# Patient Record
Sex: MALE | Race: WHITE | NOT HISPANIC OR LATINO | Employment: OTHER | URBAN - METROPOLITAN AREA
[De-identification: names, ages, dates, MRNs, and addresses within clinical notes are randomized per-mention and may not be internally consistent; named-entity substitution may affect disease eponyms.]

---

## 2021-05-19 ENCOUNTER — HOSPITAL ENCOUNTER (EMERGENCY)
Facility: HOSPITAL | Age: 66
Discharge: HOME | End: 2021-05-19
Attending: EMERGENCY MEDICINE | Admitting: EMERGENCY MEDICINE

## 2021-05-19 ENCOUNTER — APPOINTMENT (OUTPATIENT)
Dept: CT IMAGING | Facility: HOSPITAL | Age: 66
End: 2021-05-19

## 2021-05-19 ENCOUNTER — APPOINTMENT (OUTPATIENT)
Dept: GENERAL RADIOLOGY | Facility: HOSPITAL | Age: 66
End: 2021-05-19

## 2021-05-19 VITALS
RESPIRATION RATE: 16 BRPM | SYSTOLIC BLOOD PRESSURE: 154 MMHG | TEMPERATURE: 97.8 F | DIASTOLIC BLOOD PRESSURE: 91 MMHG | HEART RATE: 99 BPM | OXYGEN SATURATION: 100 %

## 2021-05-19 DIAGNOSIS — Z86.39 HISTORY OF DIET-CONTROLLED DIABETES: ICD-10-CM

## 2021-05-19 DIAGNOSIS — R55 NEAR SYNCOPE: ICD-10-CM

## 2021-05-19 DIAGNOSIS — R73.9 HYPERGLYCEMIA: ICD-10-CM

## 2021-05-19 DIAGNOSIS — T67.5XXA HEAT EXHAUSTION, INITIAL ENCOUNTER: Primary | ICD-10-CM

## 2021-05-19 LAB
ALBUMIN SERPL-MCNC: 4.7 G/DL (ref 3.5–5.2)
ALBUMIN/GLOB SERPL: 1.4 G/DL
ALP SERPL-CCNC: 107 U/L (ref 39–117)
ALT SERPL W P-5'-P-CCNC: 20 U/L (ref 1–41)
ANION GAP SERPL CALCULATED.3IONS-SCNC: 11.4 MMOL/L (ref 5–15)
AST SERPL-CCNC: 21 U/L (ref 1–40)
BASOPHILS # BLD AUTO: 0.04 10*3/MM3 (ref 0–0.2)
BASOPHILS NFR BLD AUTO: 0.3 % (ref 0–1.5)
BILIRUB SERPL-MCNC: 0.6 MG/DL (ref 0–1.2)
BUN SERPL-MCNC: 14 MG/DL (ref 8–23)
BUN/CREAT SERPL: 15.1 (ref 7–25)
CALCIUM SPEC-SCNC: 9.9 MG/DL (ref 8.6–10.5)
CHLORIDE SERPL-SCNC: 100 MMOL/L (ref 98–107)
CK SERPL-CCNC: 45 U/L (ref 20–200)
CO2 SERPL-SCNC: 26.6 MMOL/L (ref 22–29)
CREAT SERPL-MCNC: 0.93 MG/DL (ref 0.76–1.27)
DEPRECATED RDW RBC AUTO: 42.7 FL (ref 37–54)
EOSINOPHIL # BLD AUTO: 0.04 10*3/MM3 (ref 0–0.4)
EOSINOPHIL NFR BLD AUTO: 0.3 % (ref 0.3–6.2)
ERYTHROCYTE [DISTWIDTH] IN BLOOD BY AUTOMATED COUNT: 13.6 % (ref 12.3–15.4)
GFR SERPL CREATININE-BSD FRML MDRD: 82 ML/MIN/1.73
GLOBULIN UR ELPH-MCNC: 3.3 GM/DL
GLUCOSE SERPL-MCNC: 271 MG/DL (ref 65–99)
HCT VFR BLD AUTO: 46.1 % (ref 37.5–51)
HGB BLD-MCNC: 15.6 G/DL (ref 13–17.7)
HOLD SPECIMEN: NORMAL
HOLD SPECIMEN: NORMAL
IMM GRANULOCYTES # BLD AUTO: 0.1 10*3/MM3 (ref 0–0.05)
IMM GRANULOCYTES NFR BLD AUTO: 0.7 % (ref 0–0.5)
LYMPHOCYTES # BLD AUTO: 1.24 10*3/MM3 (ref 0.7–3.1)
LYMPHOCYTES NFR BLD AUTO: 8.3 % (ref 19.6–45.3)
MAGNESIUM SERPL-MCNC: 1.8 MG/DL (ref 1.6–2.4)
MCH RBC QN AUTO: 29.5 PG (ref 26.6–33)
MCHC RBC AUTO-ENTMCNC: 33.8 G/DL (ref 31.5–35.7)
MCV RBC AUTO: 87.1 FL (ref 79–97)
MONOCYTES # BLD AUTO: 0.9 10*3/MM3 (ref 0.1–0.9)
MONOCYTES NFR BLD AUTO: 6 % (ref 5–12)
NEUTROPHILS NFR BLD AUTO: 12.7 10*3/MM3 (ref 1.7–7)
NEUTROPHILS NFR BLD AUTO: 84.4 % (ref 42.7–76)
NRBC BLD AUTO-RTO: 0 /100 WBC (ref 0–0.2)
PLATELET # BLD AUTO: 187 10*3/MM3 (ref 140–450)
PMV BLD AUTO: 9.7 FL (ref 6–12)
POTASSIUM SERPL-SCNC: 4.3 MMOL/L (ref 3.5–5.2)
PROT SERPL-MCNC: 8 G/DL (ref 6–8.5)
QT INTERVAL: 351 MS
RBC # BLD AUTO: 5.29 10*6/MM3 (ref 4.14–5.8)
SODIUM SERPL-SCNC: 138 MMOL/L (ref 136–145)
TROPONIN T SERPL-MCNC: <0.01 NG/ML (ref 0–0.03)
WBC # BLD AUTO: 15.02 10*3/MM3 (ref 3.4–10.8)
WHOLE BLOOD HOLD SPECIMEN: NORMAL
WHOLE BLOOD HOLD SPECIMEN: NORMAL

## 2021-05-19 PROCEDURE — 25010000002 ONDANSETRON PER 1 MG: Performed by: PHYSICIAN ASSISTANT

## 2021-05-19 PROCEDURE — 70450 CT HEAD/BRAIN W/O DYE: CPT

## 2021-05-19 PROCEDURE — 85025 COMPLETE CBC W/AUTO DIFF WBC: CPT

## 2021-05-19 PROCEDURE — 93005 ELECTROCARDIOGRAM TRACING: CPT

## 2021-05-19 PROCEDURE — 80053 COMPREHEN METABOLIC PANEL: CPT

## 2021-05-19 PROCEDURE — 96374 THER/PROPH/DIAG INJ IV PUSH: CPT

## 2021-05-19 PROCEDURE — 93010 ELECTROCARDIOGRAM REPORT: CPT | Performed by: INTERNAL MEDICINE

## 2021-05-19 PROCEDURE — 99284 EMERGENCY DEPT VISIT MOD MDM: CPT

## 2021-05-19 PROCEDURE — 82550 ASSAY OF CK (CPK): CPT | Performed by: EMERGENCY MEDICINE

## 2021-05-19 PROCEDURE — 71045 X-RAY EXAM CHEST 1 VIEW: CPT

## 2021-05-19 PROCEDURE — 84484 ASSAY OF TROPONIN QUANT: CPT

## 2021-05-19 PROCEDURE — 83735 ASSAY OF MAGNESIUM: CPT

## 2021-05-19 RX ORDER — SODIUM CHLORIDE 0.9 % (FLUSH) 0.9 %
10 SYRINGE (ML) INJECTION AS NEEDED
Status: DISCONTINUED | OUTPATIENT
Start: 2021-05-19 | End: 2021-05-20 | Stop reason: HOSPADM

## 2021-05-19 RX ORDER — ONDANSETRON 2 MG/ML
4 INJECTION INTRAMUSCULAR; INTRAVENOUS ONCE
Status: COMPLETED | OUTPATIENT
Start: 2021-05-19 | End: 2021-05-19

## 2021-05-19 RX ADMIN — ONDANSETRON 4 MG: 2 INJECTION INTRAMUSCULAR; INTRAVENOUS at 21:28

## 2021-05-19 RX ADMIN — SODIUM CHLORIDE 2000 ML: 9 INJECTION, SOLUTION INTRAVENOUS at 21:12

## 2021-06-30 ENCOUNTER — TRANSCRIBE ORDERS (OUTPATIENT)
Dept: ADMINISTRATIVE | Facility: HOSPITAL | Age: 66
End: 2021-06-30

## 2021-06-30 DIAGNOSIS — M79.672 PAIN IN BOTH FEET: Primary | ICD-10-CM

## 2021-06-30 DIAGNOSIS — R20.2 PARESTHESIA: ICD-10-CM

## 2021-06-30 DIAGNOSIS — M79.671 PAIN IN BOTH FEET: Primary | ICD-10-CM

## 2021-09-20 ENCOUNTER — HOSPITAL ENCOUNTER (OUTPATIENT)
Dept: INFUSION THERAPY | Facility: HOSPITAL | Age: 66
Discharge: HOME | End: 2021-09-20
Admitting: PSYCHIATRY & NEUROLOGY

## 2021-09-20 DIAGNOSIS — M79.672 PAIN IN BOTH FEET: ICD-10-CM

## 2021-09-20 DIAGNOSIS — R20.2 PARESTHESIA: ICD-10-CM

## 2021-09-20 DIAGNOSIS — M79.671 PAIN IN BOTH FEET: ICD-10-CM

## 2021-09-20 PROCEDURE — 95886 MUSC TEST DONE W/N TEST COMP: CPT | Performed by: PSYCHIATRY & NEUROLOGY

## 2021-09-20 PROCEDURE — 95909 NRV CNDJ TST 5-6 STUDIES: CPT

## 2021-09-20 PROCEDURE — 95886 MUSC TEST DONE W/N TEST COMP: CPT

## 2021-09-20 PROCEDURE — 95909 NRV CNDJ TST 5-6 STUDIES: CPT | Performed by: PSYCHIATRY & NEUROLOGY

## 2021-09-20 NOTE — PROCEDURES
EMG and Nerve Conduction Studies    I.      Instrument used: Neuromax 1002  II.     Please see data sheets for tabular summary of NCS and details on methods, temperatures and lab standards.   III.    EMG muscles tested for upper extremity studies include the deltoid, biceps, triceps, pronator teres, extensor digitorum communis, first dorsal interosseous and abductor pollicis brevis.    IV.   EMG muscles tested for lower extremity studies include the vastus lateralis, tibialis anterior, peroneus longus, medial gastrocnemius and extensor digitorum brevis.    V.    Additional muscles tested as needed.  Paraspinal muscles tested as needed.   VI.   Please see data sheets for tabular summary of EMG findings.   VII. The complete report includes the data sheets.      Indication: Pain and numbness in the feet  History: 65-year-old white male with diabetes who describes pain numbness and tingling in the feet.  Symptoms are equal on both sides.  He also has chronic low back pain.      Ht: Not reported  Wt: Not reported  HbA1C: No results found for: HGBA1C  TSH: No results found for: TSH    Technical summary:  Nerve conduction studies were obtained in the right leg with 1 comparison on the left.  The feet were extremely cold initially and despite warming them did not stay warm and temperature correction was used where indicated.  Needle examination was obtained on selected muscles in both legs.    Results:  1.  Prolonged right sural sensory distal latency with temperature correction at 4.5 ms with low amplitude of 3.7 µV.  2.  Absent right superficial peroneal sensory potential.  3.  Prolonged right peroneal motor distal latency at 6.5 ms with slow conduction velocities of 27.5 m/s below the knee and 31.6 m/s in the short segment across the fibular head.  Low amplitude of 0.300 mV from ankle stimulation.  4.  Slow right tibial motor velocity at 32 m/s with normal distal latency with temperature correction.  Normal amplitudes.   Slow left tibial motor velocity at 31.9 m/s with normal distal latency and amplitudes.  5.  Needle examination of selected muscles in both legs showed fibrillations and/or positive sharp waves in the extensor digitorum brevis muscles bilaterally.  There was an increased number of large motor units on the left with increased firing rate and reduced interference pattern to a mild degree.  On the right there were very few motor units with severely reduced recruitment.  There were a few positive sharp waves in the right medial gastrocnemius.  The motor units were normal on both sides with normal recruitment remaining muscles tested showed normal insertional activities.  There was an increased number of large motor units in both peroneus longus muscles with increased firing rate and reduced interference pattern.  Remaining muscles tested showed normal motor units and recruitment patterns lumbar paraspinals at L5 showed a few positive sharp waves on each side.    Impression:  Abnormal study showing moderate to severe peripheral neuropathy.  There are both sensory and motor components and a combination of demyelinative and axonal features.  Needle exam changes were consistent with the nerve conduction findings however there were also paraspinal abnormalities suggesting root level involvement from his peripheral neuropathy versus superimposed bilateral lumbosacral radiculopathies.  Clinical correlation is suggested.  Study results were discussed with the patient.    Gal Chopra M.D.              Dictated utilizing Dragon dictation.

## 2022-08-29 ENCOUNTER — HOSPITAL ENCOUNTER (OUTPATIENT)
Dept: CARDIOLOGY | Facility: HOSPITAL | Age: 67
Discharge: HOME | End: 2022-08-29
Admitting: INTERNAL MEDICINE

## 2022-08-29 VITALS — DIASTOLIC BLOOD PRESSURE: 84 MMHG | SYSTOLIC BLOOD PRESSURE: 147 MMHG | HEART RATE: 91 BPM

## 2022-08-29 VITALS — HEIGHT: 70 IN | BODY MASS INDEX: 25.77 KG/M2 | WEIGHT: 180 LBS

## 2022-08-29 DIAGNOSIS — R07.2 PRECORDIAL PAIN: ICD-10-CM

## 2022-08-29 DIAGNOSIS — R42 DIZZINESS: ICD-10-CM

## 2022-08-29 DIAGNOSIS — R06.02 SHORTNESS OF BREATH: ICD-10-CM

## 2022-08-29 LAB
ALBUMIN SERPL-MCNC: 4.8 G/DL (ref 3.5–5.2)
ALBUMIN/GLOB SERPL: 1.6 G/DL
ALP SERPL-CCNC: 87 U/L (ref 39–117)
ALT SERPL W P-5'-P-CCNC: 14 U/L (ref 1–41)
ANION GAP SERPL CALCULATED.3IONS-SCNC: 15.8 MMOL/L (ref 5–15)
AORTIC ARCH: 2.1 CM
ASCENDING AORTA: 3.3 CM
AST SERPL-CCNC: 17 U/L (ref 1–40)
BASOPHILS # BLD AUTO: 0.04 10*3/MM3 (ref 0–0.2)
BASOPHILS NFR BLD AUTO: 0.5 % (ref 0–1.5)
BH CV ECHO MEAS - ACS: 1.75 CM
BH CV ECHO MEAS - AO MAX PG: 8.4 MMHG
BH CV ECHO MEAS - AO MEAN PG: 4.4 MMHG
BH CV ECHO MEAS - AO ROOT DIAM: 3.5 CM
BH CV ECHO MEAS - AO V2 MAX: 144.8 CM/SEC
BH CV ECHO MEAS - AO V2 VTI: 26.1 CM
BH CV ECHO MEAS - AVA(I,D): 1.36 CM2
BH CV ECHO MEAS - EDV(CUBED): 84.6 ML
BH CV ECHO MEAS - EDV(MOD-SP2): 117 ML
BH CV ECHO MEAS - EDV(MOD-SP4): 121 ML
BH CV ECHO MEAS - EF(MOD-BP): 56.2 %
BH CV ECHO MEAS - EF(MOD-SP2): 56.4 %
BH CV ECHO MEAS - EF(MOD-SP4): 60.3 %
BH CV ECHO MEAS - ESV(CUBED): 26 ML
BH CV ECHO MEAS - ESV(MOD-SP2): 51 ML
BH CV ECHO MEAS - ESV(MOD-SP4): 48 ML
BH CV ECHO MEAS - FS: 32.6 %
BH CV ECHO MEAS - IVS/LVPW: 0.94 CM
BH CV ECHO MEAS - IVSD: 0.91 CM
BH CV ECHO MEAS - LAT PEAK E' VEL: 11.5 CM/SEC
BH CV ECHO MEAS - LV DIASTOLIC VOL/BSA (35-75): 60.6 CM2
BH CV ECHO MEAS - LV MASS(C)D: 133.9 GRAMS
BH CV ECHO MEAS - LV MAX PG: 1.34 MMHG
BH CV ECHO MEAS - LV MEAN PG: 0.74 MMHG
BH CV ECHO MEAS - LV SYSTOLIC VOL/BSA (12-30): 24 CM2
BH CV ECHO MEAS - LV V1 MAX: 57.8 CM/SEC
BH CV ECHO MEAS - LV V1 VTI: 10.2 CM
BH CV ECHO MEAS - LVIDD: 4.4 CM
BH CV ECHO MEAS - LVIDS: 3 CM
BH CV ECHO MEAS - LVOT AREA: 3.5 CM2
BH CV ECHO MEAS - LVOT DIAM: 2.1 CM
BH CV ECHO MEAS - LVPWD: 0.96 CM
BH CV ECHO MEAS - MED PEAK E' VEL: 8.2 CM/SEC
BH CV ECHO MEAS - MR MAX PG: 65.4 MMHG
BH CV ECHO MEAS - MR MAX VEL: 404.2 CM/SEC
BH CV ECHO MEAS - MV A DUR: 0.08 SEC
BH CV ECHO MEAS - MV A MAX VEL: 76.3 CM/SEC
BH CV ECHO MEAS - MV DEC SLOPE: 308.4 CM/SEC2
BH CV ECHO MEAS - MV DEC TIME: 0.16 MSEC
BH CV ECHO MEAS - MV E MAX VEL: 50.1 CM/SEC
BH CV ECHO MEAS - MV E/A: 0.66
BH CV ECHO MEAS - MV MAX PG: 2.7 MMHG
BH CV ECHO MEAS - MV MEAN PG: 1.25 MMHG
BH CV ECHO MEAS - MV P1/2T: 63.4 MSEC
BH CV ECHO MEAS - MV V2 VTI: 15 CM
BH CV ECHO MEAS - MVA(P1/2T): 3.5 CM2
BH CV ECHO MEAS - MVA(VTI): 2.38 CM2
BH CV ECHO MEAS - PA ACC TIME: 0.1 SEC
BH CV ECHO MEAS - PA PR(ACCEL): 33.8 MMHG
BH CV ECHO MEAS - PA V2 MAX: 113.4 CM/SEC
BH CV ECHO MEAS - PULM A REVS DUR: 0.1 SEC
BH CV ECHO MEAS - PULM A REVS VEL: 23.6 CM/SEC
BH CV ECHO MEAS - PULM DIAS VEL: 45.8 CM/SEC
BH CV ECHO MEAS - PULM S/D: 0.72
BH CV ECHO MEAS - PULM SYS VEL: 33 CM/SEC
BH CV ECHO MEAS - QP/QS: 0.78
BH CV ECHO MEAS - RV MAX PG: 1.67 MMHG
BH CV ECHO MEAS - RV V1 MAX: 64.7 CM/SEC
BH CV ECHO MEAS - RV V1 VTI: 10.8 CM
BH CV ECHO MEAS - RVOT DIAM: 1.81 CM
BH CV ECHO MEAS - SI(MOD-SP2): 33.1 ML/M2
BH CV ECHO MEAS - SI(MOD-SP4): 36.6 ML/M2
BH CV ECHO MEAS - SUP REN AO DIAM: 2 CM
BH CV ECHO MEAS - SV(LVOT): 35.6 ML
BH CV ECHO MEAS - SV(MOD-SP2): 66 ML
BH CV ECHO MEAS - SV(MOD-SP4): 73 ML
BH CV ECHO MEAS - SV(RVOT): 27.9 ML
BH CV ECHO MEAS - TAPSE (>1.6): 2.04 CM
BH CV ECHO MEASUREMENTS AVERAGE E/E' RATIO: 5.09
BH CV XLRA - RV BASE: 3.2 CM
BH CV XLRA - RV LENGTH: 5.6 CM
BH CV XLRA - RV MID: 2.9 CM
BH CV XLRA - TDI S': 15.7 CM/SEC
BILIRUB SERPL-MCNC: 0.5 MG/DL (ref 0–1.2)
BUN SERPL-MCNC: 8 MG/DL (ref 8–23)
BUN/CREAT SERPL: 10.4 (ref 7–25)
CALCIUM SPEC-SCNC: 9.5 MG/DL (ref 8.6–10.5)
CHLORIDE SERPL-SCNC: 103 MMOL/L (ref 98–107)
CO2 SERPL-SCNC: 24.2 MMOL/L (ref 22–29)
CREAT SERPL-MCNC: 0.77 MG/DL (ref 0.76–1.27)
D DIMER PPP FEU-MCNC: <0.27 MCGFEU/ML (ref 0–0.49)
DEPRECATED RDW RBC AUTO: 42.7 FL (ref 37–54)
EGFRCR SERPLBLD CKD-EPI 2021: 98.7 ML/MIN/1.73
EOSINOPHIL # BLD AUTO: 0.12 10*3/MM3 (ref 0–0.4)
EOSINOPHIL NFR BLD AUTO: 1.5 % (ref 0.3–6.2)
ERYTHROCYTE [DISTWIDTH] IN BLOOD BY AUTOMATED COUNT: 13.9 % (ref 12.3–15.4)
GLOBULIN UR ELPH-MCNC: 3 GM/DL
GLUCOSE SERPL-MCNC: 159 MG/DL (ref 65–99)
HCT VFR BLD AUTO: 43.4 % (ref 37.5–51)
HGB BLD-MCNC: 14.5 G/DL (ref 13–17.7)
IMM GRANULOCYTES # BLD AUTO: 0.04 10*3/MM3 (ref 0–0.05)
IMM GRANULOCYTES NFR BLD AUTO: 0.5 % (ref 0–0.5)
LEFT ATRIUM VOLUME INDEX: 23.3 ML/M2
LYMPHOCYTES # BLD AUTO: 2.47 10*3/MM3 (ref 0.7–3.1)
LYMPHOCYTES NFR BLD AUTO: 31.2 % (ref 19.6–45.3)
MAXIMAL PREDICTED HEART RATE: 154 BPM
MCH RBC QN AUTO: 28.5 PG (ref 26.6–33)
MCHC RBC AUTO-ENTMCNC: 33.4 G/DL (ref 31.5–35.7)
MCV RBC AUTO: 85.4 FL (ref 79–97)
MONOCYTES # BLD AUTO: 0.48 10*3/MM3 (ref 0.1–0.9)
MONOCYTES NFR BLD AUTO: 6.1 % (ref 5–12)
NEUTROPHILS NFR BLD AUTO: 4.77 10*3/MM3 (ref 1.7–7)
NEUTROPHILS NFR BLD AUTO: 60.2 % (ref 42.7–76)
NRBC BLD AUTO-RTO: 0.1 /100 WBC (ref 0–0.2)
NT-PROBNP SERPL-MCNC: 155 PG/ML (ref 0–900)
PLATELET # BLD AUTO: 179 10*3/MM3 (ref 140–450)
PMV BLD AUTO: 9.9 FL (ref 6–12)
POTASSIUM SERPL-SCNC: 4 MMOL/L (ref 3.5–5.2)
PROT SERPL-MCNC: 7.8 G/DL (ref 6–8.5)
RBC # BLD AUTO: 5.08 10*6/MM3 (ref 4.14–5.8)
SINUS: 3.1 CM
SODIUM SERPL-SCNC: 143 MMOL/L (ref 136–145)
STJ: 2.5 CM
STRESS TARGET HR: 131 BPM
TROPONIN T SERPL-MCNC: <0.01 NG/ML (ref 0–0.03)
WBC NRBC COR # BLD: 7.92 10*3/MM3 (ref 3.4–10.8)

## 2022-08-29 PROCEDURE — 80053 COMPREHEN METABOLIC PANEL: CPT

## 2022-08-29 PROCEDURE — 94760 N-INVAS EAR/PLS OXIMETRY 1: CPT

## 2022-08-29 PROCEDURE — 93306 TTE W/DOPPLER COMPLETE: CPT | Performed by: INTERNAL MEDICINE

## 2022-08-29 PROCEDURE — 36415 COLL VENOUS BLD VENIPUNCTURE: CPT

## 2022-08-29 PROCEDURE — 25010000002 PERFLUTREN (DEFINITY) 8.476 MG IN SODIUM CHLORIDE (PF) 0.9 % 10 ML INJECTION: Performed by: INTERNAL MEDICINE

## 2022-08-29 PROCEDURE — 99204 OFFICE O/P NEW MOD 45 MIN: CPT | Performed by: INTERNAL MEDICINE

## 2022-08-29 PROCEDURE — 83880 ASSAY OF NATRIURETIC PEPTIDE: CPT | Performed by: INTERNAL MEDICINE

## 2022-08-29 PROCEDURE — 85379 FIBRIN DEGRADATION QUANT: CPT | Performed by: INTERNAL MEDICINE

## 2022-08-29 PROCEDURE — 85025 COMPLETE CBC W/AUTO DIFF WBC: CPT

## 2022-08-29 PROCEDURE — 84484 ASSAY OF TROPONIN QUANT: CPT | Performed by: INTERNAL MEDICINE

## 2022-08-29 PROCEDURE — 93306 TTE W/DOPPLER COMPLETE: CPT

## 2022-08-29 PROCEDURE — 93010 ELECTROCARDIOGRAM REPORT: CPT | Performed by: INTERNAL MEDICINE

## 2022-08-29 PROCEDURE — 93005 ELECTROCARDIOGRAM TRACING: CPT | Performed by: INTERNAL MEDICINE

## 2022-08-29 RX ORDER — NITROGLYCERIN 0.4 MG/1
0.4 TABLET SUBLINGUAL
Status: SHIPPED | OUTPATIENT
Start: 2022-08-29

## 2022-08-29 RX ORDER — GABAPENTIN 300 MG/1
CAPSULE ORAL
COMMUNITY
Start: 2022-06-26

## 2022-08-29 RX ORDER — LISINOPRIL 20 MG/1
TABLET ORAL
COMMUNITY
Start: 2022-08-08

## 2022-08-29 RX ORDER — SODIUM CHLORIDE 0.9 % (FLUSH) 0.9 %
10 SYRINGE (ML) INJECTION AS NEEDED
Status: SHIPPED | OUTPATIENT
Start: 2022-08-29

## 2022-08-29 RX ORDER — SIMVASTATIN 20 MG
TABLET ORAL
COMMUNITY
Start: 2022-08-08

## 2022-08-29 RX ORDER — GLYBURIDE 5 MG/1
TABLET ORAL
COMMUNITY
Start: 2022-08-08

## 2022-08-29 RX ADMIN — PERFLUTREN 1.5 ML: 6.52 INJECTION, SUSPENSION INTRAVENOUS at 16:32

## 2022-08-29 NOTE — PROGRESS NOTES
[facility]      Patient Name: Shadi Field  :1955  Age: 66 y.o.  Encounter Provider:  PERLA AGUILAR      Chief Complaint:   Chief Complaint   Patient presents with   • Chest Pain   • Shortness of Breath         HPI  Shadi Field is a 66 y.o. male with past medical history of remote tobacco abuse, diabetes and hypertension presents for initial evaluation of chest pain.  He has had fairly constant chest discomfort over the last 3 days.  No associated nausea, vomiting or diaphoresis.  He thinks there is some increased shortness of air and a pleuritic component to the discomfort.  No clear relationship to physical activity.  He has questionable complaints of orthopnea but no PND or edema.  No palpitations, dizziness or syncope.  He is most concerned about some ear pain and left neck discomfort which is fairly constant as well.  He reports he was told by an ENT physician at evaluation that it was likely carotid atherosclerosis.  No history of stroke.  He has longstanding history of dizziness which he was told was vertigo in the past.  No postural component.  He is an ex-smoker who drinks socially denies illicit drug use.  His father had cardiac issues for which she received a pacemaker but no clear family history of coronary artery disease or sudden cardiac death.      The following portions of the patient's history were reviewed and updated as appropriate: allergies, current medications, past family history, past medical history, past social history, past surgical history and problem list.      Review of Systems   Constitutional: Negative for chills and fever.   HENT: Negative for hoarse voice and sore throat.    Eyes: Negative for double vision and photophobia.   Cardiovascular: Positive for chest pain and dyspnea on exertion. Negative for leg swelling, near-syncope, orthopnea, palpitations, paroxysmal nocturnal dyspnea and syncope.   Respiratory: Negative for cough and wheezing.   "  Skin: Negative for poor wound healing and rash.   Musculoskeletal: Negative for arthritis and joint swelling.   Gastrointestinal: Negative for bloating, abdominal pain, hematemesis and hematochezia.   Neurological: Negative for dizziness and focal weakness.   Psychiatric/Behavioral: Negative for depression and suicidal ideas.       OBJECTIVE:   Vital Signs  Vitals:    08/29/22 1541   BP: 147/84   Pulse: 91     Estimated body mass index is 25.83 kg/m² as calculated from the following:    Height as of an earlier encounter on 8/29/22: 177.8 cm (70\").    Weight as of an earlier encounter on 8/29/22: 81.6 kg (180 lb).    Vitals reviewed.   Constitutional:       Appearance: Healthy appearance. Not in distress.   HENT:         Comments: No carotid bruit  Neck:      Vascular: No JVR. JVD normal.   Pulmonary:      Effort: Pulmonary effort is normal.      Breath sounds: Normal breath sounds. No wheezing. No rhonchi. No rales.   Chest:      Chest wall: Not tender to palpatation.   Cardiovascular:      PMI at left midclavicular line. Normal rate. Regular rhythm. Normal S1. Normal S2.      Murmurs: There is no murmur.      No gallop. No click. No rub.   Pulses:     Intact distal pulses.   Edema:     Peripheral edema absent.   Abdominal:      General: Bowel sounds are normal.      Palpations: Abdomen is soft.      Tenderness: There is no abdominal tenderness.   Musculoskeletal: Normal range of motion.         General: No tenderness. Skin:     General: Skin is warm and dry.   Neurological:      General: No focal deficit present.      Mental Status: Alert and oriented to person, place and time.           ECG 12 Lead    Date/Time: 8/29/2022 5:03 PM  Performed by: Krishan Lyles Jr., MD  Authorized by: Krishan Lyles Jr., MD   Comparison: not compared with previous ECG   Previous ECG: no previous ECG available  Rhythm: sinus rhythm    Clinical impression: normal ECG                  ASSESSMENT:      Diagnosis Plan   1. Precordial " pain  Cardiac Monitoring    Vital Signs - Once    Vital Signs - As Needed    Pulse Oximetry    Oxygen Therapy- Nasal Cannula; Titrate for SPO2: 92%, equal to or greater than    Insert Peripheral IV    sodium chloride 0.9 % flush 10 mL    nitroglycerin (NITROSTAT) SL tablet 0.4 mg    NPO Diet NPO Type: Strict NPO    Bathroom Privileges With Assistance    CBC & Differential    Comprehensive Metabolic Panel    Troponin T    D-Dimer    proBNP    ECG 12 Lead    Cardiac Monitoring    Vital Signs - Once    Insert Peripheral IV    NPO Diet NPO Type: Strict NPO    Bathroom Privileges With Assistance    Troponin T    Troponin T    D-Dimer    D-Dimer    proBNP    proBNP    Adult Transthoracic Echo Complete W/ Cont if Necessary Per Protocol    Stress Test With Myocardial Perfusion One Day   2. Shortness of breath  Cardiac Monitoring    Vital Signs - Once    Vital Signs - As Needed    Pulse Oximetry    Oxygen Therapy- Nasal Cannula; Titrate for SPO2: 92%, equal to or greater than    Insert Peripheral IV    sodium chloride 0.9 % flush 10 mL    nitroglycerin (NITROSTAT) SL tablet 0.4 mg    NPO Diet NPO Type: Strict NPO    Bathroom Privileges With Assistance    CBC & Differential    Comprehensive Metabolic Panel    Troponin T    D-Dimer    proBNP    ECG 12 Lead    Cardiac Monitoring    Vital Signs - Once    Insert Peripheral IV    NPO Diet NPO Type: Strict NPO    Bathroom Privileges With Assistance    Troponin T    Troponin T    D-Dimer    D-Dimer    proBNP    proBNP    Adult Transthoracic Echo Complete W/ Cont if Necessary Per Protocol    Stress Test With Myocardial Perfusion One Day   3. Dizziness  Cardiac Monitoring    Vital Signs - Once    Vital Signs - As Needed    Pulse Oximetry    Oxygen Therapy- Nasal Cannula; Titrate for SPO2: 92%, equal to or greater than    Insert Peripheral IV    sodium chloride 0.9 % flush 10 mL    nitroglycerin (NITROSTAT) SL tablet 0.4 mg    NPO Diet NPO Type: Strict NPO    Bathroom Privileges With  Assistance    CBC & Differential    Comprehensive Metabolic Panel    Troponin T    D-Dimer    proBNP    ECG 12 Lead    Cardiac Monitoring    Vital Signs - Once    Insert Peripheral IV    NPO Diet NPO Type: Strict NPO    Bathroom Privileges With Assistance    Troponin T    Troponin T    D-Dimer    D-Dimer    proBNP    proBNP    Adult Transthoracic Echo Complete W/ Cont if Necessary Per Protocol    Stress Test With Myocardial Perfusion One Day         PLAN OF CARE:     1. Atypical chest pain -normal echo today in clinic.  Negative troponin and normal BNP.  Etiology uncertain.  Given high risk clinical factors we will plan for stress study in the morning.  2. Exertional dyspnea -again no clear etiology.  If stress study is negative I will defer to Dr. Peters for consideration of pulmonary referral.  3. Diabetes  4. Hypertension  5. Remote history of tobacco abuse -counseled on need for remained abstinence from nicotine products  6. Neck pain/dizziness -we will check an ultrasound carotid but I hear no carotid bruit and have low suspicion for carotid atherosclerosis as the underlying cause.    Return to clinic 1 month             Discharge Medications      ASK your doctor about these medications      Instructions Start Date   gabapentin 300 MG capsule  Commonly known as: NEURONTIN   No dose, route, or frequency recorded.      glyburide 5 MG tablet  Commonly known as: DIAbeta   No dose, route, or frequency recorded.      lisinopril 20 MG tablet  Commonly known as: PRINIVIL,ZESTRIL   No dose, route, or frequency recorded.      simvastatin 20 MG tablet  Commonly known as: ZOCOR   No dose, route, or frequency recorded.             Thank you for allowing me to participate in the care of your patient,      Sincerely,   Krishan Lyles Jr, MD  [facility]  08/29/22  17:00 EDT

## 2022-08-30 ENCOUNTER — HOSPITAL ENCOUNTER (OUTPATIENT)
Dept: CARDIOLOGY | Facility: HOSPITAL | Age: 67
Discharge: HOME | End: 2022-08-30
Admitting: INTERNAL MEDICINE

## 2022-08-30 ENCOUNTER — TELEPHONE (OUTPATIENT)
Dept: CARDIOLOGY | Facility: CLINIC | Age: 67
End: 2022-08-30

## 2022-08-30 VITALS — HEIGHT: 70 IN | WEIGHT: 179.9 LBS | BODY MASS INDEX: 25.75 KG/M2

## 2022-08-30 DIAGNOSIS — R42 DIZZINESS: Primary | ICD-10-CM

## 2022-08-30 LAB
BH CV NUCLEAR PRIOR STUDY: 2
BH CV REST NUCLEAR ISOTOPE DOSE: 11.4 MCI
BH CV STRESS BP STAGE 1: NORMAL
BH CV STRESS COMMENTS STAGE 1: NORMAL
BH CV STRESS DOSE REGADENOSON STAGE 1: 0.4
BH CV STRESS DURATION MIN STAGE 1: 0
BH CV STRESS DURATION SEC STAGE 1: 10
BH CV STRESS HR STAGE 1: 125
BH CV STRESS NUCLEAR ISOTOPE DOSE: 33.6 MCI
BH CV STRESS PROTOCOL 1: NORMAL
BH CV STRESS RECOVERY BP: NORMAL MMHG
BH CV STRESS RECOVERY HR: 98 BPM
BH CV STRESS STAGE 1: 1
LV EF NUC BP: 51 %
MAXIMAL PREDICTED HEART RATE: 154 BPM
PERCENT MAX PREDICTED HR: 81.17 %
STRESS BASELINE BP: NORMAL MMHG
STRESS BASELINE HR: 103 BPM
STRESS PERCENT HR: 95 %
STRESS POST EXERCISE DUR SEC: 10 SEC
STRESS POST PEAK BP: NORMAL MMHG
STRESS POST PEAK HR: 125 BPM
STRESS TARGET HR: 131 BPM

## 2022-08-30 PROCEDURE — 25010000002 AMINOPHYLLINE PER 250 MG: Performed by: INTERNAL MEDICINE

## 2022-08-30 PROCEDURE — 93016 CV STRESS TEST SUPVJ ONLY: CPT | Performed by: INTERNAL MEDICINE

## 2022-08-30 PROCEDURE — 78452 HT MUSCLE IMAGE SPECT MULT: CPT

## 2022-08-30 PROCEDURE — 78452 HT MUSCLE IMAGE SPECT MULT: CPT | Performed by: INTERNAL MEDICINE

## 2022-08-30 PROCEDURE — 93017 CV STRESS TEST TRACING ONLY: CPT

## 2022-08-30 PROCEDURE — 25010000002 REGADENOSON 0.4 MG/5ML SOLUTION: Performed by: INTERNAL MEDICINE

## 2022-08-30 PROCEDURE — A9502 TC99M TETROFOSMIN: HCPCS | Performed by: INTERNAL MEDICINE

## 2022-08-30 PROCEDURE — 93018 CV STRESS TEST I&R ONLY: CPT | Performed by: INTERNAL MEDICINE

## 2022-08-30 PROCEDURE — 0 TECHNETIUM TETROFOSMIN KIT: Performed by: INTERNAL MEDICINE

## 2022-08-30 RX ORDER — AMINOPHYLLINE DIHYDRATE 25 MG/ML
125 INJECTION, SOLUTION INTRAVENOUS ONCE AS NEEDED
Status: COMPLETED | OUTPATIENT
Start: 2022-08-30 | End: 2022-08-30

## 2022-08-30 RX ADMIN — TETROFOSMIN 1 DOSE: 1.38 INJECTION, POWDER, LYOPHILIZED, FOR SOLUTION INTRAVENOUS at 13:27

## 2022-08-30 RX ADMIN — REGADENOSON 0.4 MG: 0.08 INJECTION, SOLUTION INTRAVENOUS at 13:27

## 2022-08-30 RX ADMIN — TETROFOSMIN 1 DOSE: 1.38 INJECTION, POWDER, LYOPHILIZED, FOR SOLUTION INTRAVENOUS at 12:28

## 2022-08-30 RX ADMIN — AMINOPHYLLINE 125 MG: 25 INJECTION, SOLUTION INTRAVENOUS at 13:29

## 2022-08-30 NOTE — TELEPHONE ENCOUNTER
Spoke to patient about stress results.  No evidence of ischemia.  Normal echo yesterday.  If pulmonary complaints persist would benefit from pulmonology referral.  Ultrasound carotids ordered.

## 2022-08-31 ENCOUNTER — TRANSCRIBE ORDERS (OUTPATIENT)
Dept: ADMINISTRATIVE | Facility: HOSPITAL | Age: 67
End: 2022-08-31

## 2022-08-31 DIAGNOSIS — R22.1 MASS IN NECK: ICD-10-CM

## 2022-08-31 DIAGNOSIS — R68.84 JAW PAIN: ICD-10-CM

## 2022-08-31 DIAGNOSIS — H92.09 OTALGIA, UNSPECIFIED LATERALITY: ICD-10-CM

## 2022-08-31 DIAGNOSIS — K11.20 SIALADENITIS: ICD-10-CM

## 2022-08-31 DIAGNOSIS — R52 PAIN: Primary | ICD-10-CM

## 2022-09-12 ENCOUNTER — HOSPITAL ENCOUNTER (OUTPATIENT)
Dept: CARDIOLOGY | Facility: HOSPITAL | Age: 67
Discharge: HOME | End: 2022-09-12

## 2022-09-12 ENCOUNTER — HOSPITAL ENCOUNTER (OUTPATIENT)
Dept: CT IMAGING | Facility: HOSPITAL | Age: 67
Discharge: HOME | End: 2022-09-12

## 2022-09-12 ENCOUNTER — APPOINTMENT (OUTPATIENT)
Dept: CT IMAGING | Facility: HOSPITAL | Age: 67
End: 2022-09-12

## 2022-09-12 DIAGNOSIS — H92.09 OTALGIA, UNSPECIFIED LATERALITY: ICD-10-CM

## 2022-09-12 DIAGNOSIS — K11.20 SIALADENITIS: ICD-10-CM

## 2022-09-12 DIAGNOSIS — R22.1 MASS IN NECK: ICD-10-CM

## 2022-09-12 DIAGNOSIS — R52 PAIN: ICD-10-CM

## 2022-09-12 DIAGNOSIS — R68.84 JAW PAIN: ICD-10-CM

## 2022-09-12 DIAGNOSIS — R42 DIZZINESS: ICD-10-CM

## 2022-09-12 LAB
BH CV XLRA MEAS LEFT DIST CCA EDV: -14.9 CM/SEC
BH CV XLRA MEAS LEFT DIST CCA PSV: -57 CM/SEC
BH CV XLRA MEAS LEFT DIST ICA EDV: -20 CM/SEC
BH CV XLRA MEAS LEFT DIST ICA PSV: -50.3 CM/SEC
BH CV XLRA MEAS LEFT ICA/CCA RATIO: 1.07
BH CV XLRA MEAS LEFT MID ICA EDV: -21.2 CM/SEC
BH CV XLRA MEAS LEFT MID ICA PSV: -61.3 CM/SEC
BH CV XLRA MEAS LEFT PROX CCA EDV: 22.8 CM/SEC
BH CV XLRA MEAS LEFT PROX CCA PSV: 79.4 CM/SEC
BH CV XLRA MEAS LEFT PROX ECA EDV: -16.5 CM/SEC
BH CV XLRA MEAS LEFT PROX ECA PSV: -137 CM/SEC
BH CV XLRA MEAS LEFT PROX ICA EDV: -20 CM/SEC
BH CV XLRA MEAS LEFT PROX ICA PSV: -46.4 CM/SEC
BH CV XLRA MEAS LEFT PROX SCLA PSV: 104 CM/SEC
BH CV XLRA MEAS LEFT VERTEBRAL A EDV: 14.9 CM/SEC
BH CV XLRA MEAS LEFT VERTEBRAL A PSV: 42.8 CM/SEC
BH CV XLRA MEAS RIGHT DIST CCA EDV: 13 CM/SEC
BH CV XLRA MEAS RIGHT DIST CCA PSV: 53.8 CM/SEC
BH CV XLRA MEAS RIGHT DIST ICA EDV: -26.4 CM/SEC
BH CV XLRA MEAS RIGHT DIST ICA PSV: -71.5 CM/SEC
BH CV XLRA MEAS RIGHT ICA/CCA RATIO: 1.33
BH CV XLRA MEAS RIGHT MID ICA PSV: -63.9 CM/SEC
BH CV XLRA MEAS RIGHT PROX CCA EDV: -12.3 CM/SEC
BH CV XLRA MEAS RIGHT PROX CCA PSV: -73.3 CM/SEC
BH CV XLRA MEAS RIGHT PROX ECA EDV: -10 CM/SEC
BH CV XLRA MEAS RIGHT PROX ECA PSV: -83.3 CM/SEC
BH CV XLRA MEAS RIGHT PROX ICA EDV: -19.9 CM/SEC
BH CV XLRA MEAS RIGHT PROX ICA PSV: -66.8 CM/SEC
BH CV XLRA MEAS RIGHT PROX SCLA PSV: 84.9 CM/SEC
BH CV XLRA MEAS RIGHT VERTEBRAL A EDV: 10.2 CM/SEC
BH CV XLRA MEAS RIGHT VERTEBRAL A PSV: 31.4 CM/SEC
LEFT ARM BP: NORMAL MMHG
MAXIMAL PREDICTED HEART RATE: 154 BPM
RIGHT ARM BP: NORMAL MMHG
STRESS TARGET HR: 131 BPM

## 2022-09-12 PROCEDURE — 93880 EXTRACRANIAL BILAT STUDY: CPT

## 2022-09-12 PROCEDURE — 70491 CT SOFT TISSUE NECK W/DYE: CPT

## 2022-09-12 PROCEDURE — 70470 CT HEAD/BRAIN W/O & W/DYE: CPT

## 2022-09-12 PROCEDURE — 25010000002 IOPAMIDOL 61 % SOLUTION: Performed by: FAMILY MEDICINE

## 2022-09-12 RX ADMIN — IOPAMIDOL 100 ML: 612 INJECTION, SOLUTION INTRAVENOUS at 10:54

## 2023-04-10 NOTE — PROGRESS NOTES
"Subjective   History of Present Illness: Shadi Field is a 67 y.o. male is being seen for consultation today at the request of Shadi Peters MD for Headaches, dizziness, visual changes, and balance issues memory issues.  He states he has been struggling with these issues for quite some time and recently saw ENT who pointed out the incidental finding seen on 2 prior CTs of a meningioma.  He is quite frustrated he was not told about the \"brain tumor\" and is quite anxious about what this means for him.  Takes a lot of questioning to pick out the specifics of his history as he is struggling with recalling what he was told by different physicians and when and what prior work-up he has had prior to our evaluation today.    While in the room and during my examination of the patient I wore a mask.  I washed my hands before and after this patient encounter.  The patient was also wearing a mask.    History of Present Illness    Tobacco Use: Medium Risk   • Smoking Tobacco Use: Former   • Smokeless Tobacco Use: Never   • Passive Exposure: Not on file        The following portions of the patient's history were reviewed and updated as appropriate: allergies, current medications, past family history, past medical history, past social history, past surgical history and problem list.    Review of Systems    Objective     Vitals:    04/11/23 1059   BP: 142/90   Pulse: 111   SpO2: 97%   Weight: 81.2 kg (179 lb)   Height: 177 cm (69.69\")     Body mass index is 25.92 kg/m².      Physical Exam  Neurologic Exam    Physical Exam:    CONSTITUTIONAL: This 67 year old  male appears well developed, well-nourished and in no acute distress.    HEAD & FACE: the head and face are symmetric, normocephalic and atraumatic.    EYES: Inspection of the conjunctivae and lids reveals no swelling, erythema or discharge.  Pupils are round, equal and reactive to light and there is no scleral icterus.    FUNDOSCOPIC:  There are no retinal " hemorrhages bilaterally.  There is no papilledema bilaterally.    EARS, NOSE, MOUTH & THROAT: On inspection, the ears, nose and oral cavity are within normal limits.    NECK: the neck is supple and symmetric. The trachea is midline with no masses.  Range of motion of the neck is good without any pain.    PULMONARY: Respiratory effort is normal with no increased work of breathing or signs of respiratory distress.    CARDIOVASCULAR: Pedal pulses are +2/4 bilaterally. Examination of the extremities shows no edema or varicosities.    LYMPHATIC: There is no palpable lymphadenopathy of the neck.    MUSCULOSKELETAL: Gait and station steady but slightly wide-based. The spine has no tenderness to palpation    SKIN: The skin is warm, dry and intact    NEUROLOGIC:    Cranial Nerves 2 through 12 are grossly intact with symmetrical reactive pupils  Normal motor strength noted without ulnar drift. Muscle bulk and tone are normal.  Sensory exam is normal to fine touch to confrontational testing bilaterally  Reflexes on the right side demonstrates 1/4 Triceps Reflex, 2/4 Biceps Reflex, 1/4 Brachioradialis Reflex, 2/4 Knee Jerk Reflex, 1/4 Ankle Jerk Reflex and no ankle clonus on the right.   Reflexes on the left side demonstrates 1/4 Triceps Reflex, 2/4 Biceps Reflex, 1/4 Brachioradialis Reflex, 2/4 Knee Jerk Reflex, 1/4 Ankle Jerk Reflex and no ankle clonus on the left.  Superficial/Primitive Reflexes: primitive reflexes were absent.  Roche's, Babinski and Clonus all negative.  No overt coordination deficit observed though he is slow and calculated with movement.  Cortical function reveals he does not recall the specifics of his prior evaluations and struggles to recall what tests were done by his physicians recently. Speech is normal and fluent.    PSYCHIATRIC: oriented to person, place and time. Patient's mood and affect are blunted.      Assessment & Plan   Independent Review of Radiographic Studies:      I personally  "reviewed the images from the following studies.    CT scan of the head with and without contrast done on September 12, 2022 was done for \"jaw pain\" was essentially unremarkable with the exception of a calcified meningioma along the superior aspect of the left frontal lobe measuring 11 x 6 x 11 mm in its greatest dimensions and was compared to a prior CT scan study done in May 2021 where it was identical in size and configuration.    Medical Decision Making:      First and foremost the meningioma is clearly incidental as it does not cause any contact of the frontal lobe nor does it cause any threat to the underlying brain or vascular structures.  It is stable when compared to the initial study done in 2021.  I reassured him that this is not something that would be considered for surgery and I do not believe it could be responsible for any of his symptomatology.  It does warrant observation and generally repeat imaging studies once a year when the incidental meningiomas are found for a period of 5 years to make sure they do not show a threat or growth potential.    His symptomatology are concerning for a primary neurologic disorder and therefore I have suggested we get an MRI of the brain with and without contrast and have him follow-up with a medical neurologist for a formal opinion.    Return in about 1 year (around 4/11/2024) for review of annual CT head.    Diagnoses and all orders for this visit:    1. Meningioma (Primary)  -     MRI Brain With & Without Contrast; Future  -     CT Head Without Contrast; Future    2. Memory disorder  -     MRI Brain With & Without Contrast; Future  -     Ambulatory Referral to Neurology    3. Imbalance  -     MRI Brain With & Without Contrast; Future  -     Ambulatory Referral to Neurology    4. Vertigo  -     MRI Brain With & Without Contrast; Future  -     Ambulatory Referral to Neurology             Han Cancino MD FACS [facility]  Neurological Surgery            "

## 2023-04-11 ENCOUNTER — OFFICE VISIT (OUTPATIENT)
Dept: NEUROSURGERY | Facility: CLINIC | Age: 68
End: 2023-04-11
Payer: MEDICARE

## 2023-04-11 VITALS
SYSTOLIC BLOOD PRESSURE: 142 MMHG | OXYGEN SATURATION: 97 % | DIASTOLIC BLOOD PRESSURE: 90 MMHG | HEART RATE: 111 BPM | WEIGHT: 179 LBS | HEIGHT: 70 IN | BODY MASS INDEX: 25.62 KG/M2

## 2023-04-11 DIAGNOSIS — D32.9 MENINGIOMA: Primary | ICD-10-CM

## 2023-04-11 DIAGNOSIS — R26.89 IMBALANCE: ICD-10-CM

## 2023-04-11 DIAGNOSIS — R41.3 MEMORY DISORDER: ICD-10-CM

## 2023-04-11 DIAGNOSIS — R42 VERTIGO: ICD-10-CM

## 2023-04-11 PROCEDURE — 1159F MED LIST DOCD IN RCRD: CPT | Performed by: NEUROLOGICAL SURGERY

## 2023-04-11 PROCEDURE — 1160F RVW MEDS BY RX/DR IN RCRD: CPT | Performed by: NEUROLOGICAL SURGERY

## 2023-04-11 PROCEDURE — 99204 OFFICE O/P NEW MOD 45 MIN: CPT | Performed by: NEUROLOGICAL SURGERY

## 2023-04-11 RX ORDER — OMEPRAZOLE 40 MG/1
CAPSULE, DELAYED RELEASE ORAL
COMMUNITY
Start: 2023-04-08

## 2023-04-11 RX ORDER — SEMAGLUTIDE 0.68 MG/ML
INJECTION, SOLUTION SUBCUTANEOUS
COMMUNITY
Start: 2023-04-10

## 2023-05-02 ENCOUNTER — HOSPITAL ENCOUNTER (OUTPATIENT)
Dept: MRI IMAGING | Facility: HOSPITAL | Age: 68
Discharge: HOME | End: 2023-05-02
Admitting: NEUROLOGICAL SURGERY
Payer: MEDICARE

## 2023-05-02 ENCOUNTER — TELEPHONE (OUTPATIENT)
Dept: NEUROSURGERY | Facility: CLINIC | Age: 68
End: 2023-05-02
Payer: MEDICARE

## 2023-05-02 DIAGNOSIS — R42 VERTIGO: ICD-10-CM

## 2023-05-02 DIAGNOSIS — D32.9 MENINGIOMA: Primary | ICD-10-CM

## 2023-05-02 DIAGNOSIS — D32.9 MENINGIOMA: ICD-10-CM

## 2023-05-02 DIAGNOSIS — R26.89 IMBALANCE: ICD-10-CM

## 2023-05-02 DIAGNOSIS — R41.3 MEMORY DISORDER: ICD-10-CM

## 2023-05-02 PROCEDURE — 82565 ASSAY OF CREATININE: CPT

## 2023-05-02 PROCEDURE — 70551 MRI BRAIN STEM W/O DYE: CPT

## 2023-05-02 RX ORDER — DIAZEPAM 5 MG/1
5 TABLET ORAL
Qty: 2 TABLET | Refills: 0 | Status: SHIPPED | OUTPATIENT
Start: 2023-05-02 | End: 2023-05-02

## 2023-05-02 NOTE — TELEPHONE ENCOUNTER
PATIENT WAS UNABLE TO COMPLETE HIS MRI.  WHILE IN THE PROCESS HE STATES THAT HE WAS GETTING VERY SICK AND HAD TO STOP.  HE BELIEVES IT WAS CLAUSTROPHOBIC.  HE ALSO WANTED TO INFORM DR SAMAYOA THAT SINCE HIS LAST OV, HE IS COMPLETELY DEATH IN THE RIGHT EAR AND CAN BARELY HERE OUT OF THE LEFT.  PLEASE ADVISE.

## 2023-05-02 NOTE — TELEPHONE ENCOUNTER
Patient is concerned about loss of hearing in his R ear since last office visit. He is also requesting advice or something to help him get through a MRI because he is claustrophobic. [facility] pharmacy is correct in chart.

## 2023-05-02 NOTE — TELEPHONE ENCOUNTER
I sent in Valium to his pharmacy that was defaulted in the computer.  Hearing loss in the right ear would not be related to the incidental meningioma on the left frontal region.  He may need to check with his primary physician regarding the hearing loss until he sees the neurologist in 2 weeks

## 2023-05-03 ENCOUNTER — TELEPHONE (OUTPATIENT)
Dept: NEUROSURGERY | Facility: CLINIC | Age: 68
End: 2023-05-03
Payer: MEDICARE

## 2023-05-03 DIAGNOSIS — D32.9 MENINGIOMA: Primary | ICD-10-CM

## 2023-05-03 DIAGNOSIS — R41.3 MEMORY DISORDER: ICD-10-CM

## 2023-05-03 LAB — CREAT BLDA-MCNC: 0.8 MG/DL (ref 0.6–1.3)

## 2023-05-03 NOTE — TELEPHONE ENCOUNTER
Caller: Shadi Field    Relationship to patient: Self    Best call back number: [phone]    Patient is needing:     REC'D CALL FROM PATIENT, HE WENT TO RESCHEDULE HIS MRI AND CENTRAL SCHEDULING ADVISED PATIENT THE ORDER WAS CANCELLED.    PLEASE ADVISE

## 2023-05-28 ENCOUNTER — HOSPITAL ENCOUNTER (OUTPATIENT)
Dept: MRI IMAGING | Facility: HOSPITAL | Age: 68
Discharge: HOME | End: 2023-05-28
Admitting: NEUROLOGICAL SURGERY

## 2023-05-28 DIAGNOSIS — R41.3 MEMORY DISORDER: ICD-10-CM

## 2023-05-28 DIAGNOSIS — D32.9 MENINGIOMA: ICD-10-CM

## 2023-05-28 PROCEDURE — A9577 INJ MULTIHANCE: HCPCS | Performed by: NEUROLOGICAL SURGERY

## 2023-05-28 PROCEDURE — 70553 MRI BRAIN STEM W/O & W/DYE: CPT

## 2023-05-28 PROCEDURE — 0 GADOBENATE DIMEGLUMINE 529 MG/ML SOLUTION: Performed by: NEUROLOGICAL SURGERY

## 2023-05-28 RX ADMIN — GADOBENATE DIMEGLUMINE 17 ML: 529 INJECTION, SOLUTION INTRAVENOUS at 14:44

## 2023-05-30 ENCOUNTER — OFFICE VISIT (OUTPATIENT)
Dept: NEUROLOGY | Facility: CLINIC | Age: 68
End: 2023-05-30

## 2023-05-30 VITALS
HEIGHT: 70 IN | WEIGHT: 180 LBS | HEART RATE: 102 BPM | SYSTOLIC BLOOD PRESSURE: 142 MMHG | OXYGEN SATURATION: 96 % | BODY MASS INDEX: 25.77 KG/M2 | DIASTOLIC BLOOD PRESSURE: 78 MMHG

## 2023-05-30 DIAGNOSIS — R26.89 IMBALANCE: ICD-10-CM

## 2023-05-30 DIAGNOSIS — E11.42 DIABETIC POLYNEUROPATHY ASSOCIATED WITH TYPE 2 DIABETES MELLITUS: ICD-10-CM

## 2023-05-30 DIAGNOSIS — R41.3 MEMORY LOSS: Primary | ICD-10-CM

## 2023-05-30 DIAGNOSIS — R42 VERTIGO: Primary | ICD-10-CM

## 2023-05-30 DIAGNOSIS — R41.3 MEMORY LOSS: ICD-10-CM

## 2023-05-30 DIAGNOSIS — D32.9 MENINGIOMA: ICD-10-CM

## 2023-05-30 RX ORDER — DULOXETIN HYDROCHLORIDE 30 MG/1
30 CAPSULE, DELAYED RELEASE ORAL DAILY
Qty: 7 CAPSULE | Refills: 0 | Status: SHIPPED | OUTPATIENT
Start: 2023-05-30 | End: 2024-05-29

## 2023-05-30 RX ORDER — DULOXETIN HYDROCHLORIDE 60 MG/1
60 CAPSULE, DELAYED RELEASE ORAL DAILY
Qty: 30 CAPSULE | Refills: 2 | Status: SHIPPED | OUTPATIENT
Start: 2023-06-06 | End: 2024-06-05

## 2023-05-30 RX ORDER — GABAPENTIN 100 MG/1
100 CAPSULE ORAL AS NEEDED
COMMUNITY
End: 2023-05-30 | Stop reason: ALTCHOICE

## 2023-05-30 NOTE — LETTER
"May 30, 2023       No Recipients    Patient: Shadi Fiedl   YOB: 1955   Date of Visit: 5/30/2023       Dear Dr. Hernandez Recipients:    Thank you for referring Shadi Field to me for evaluation. Below are the relevant portions of my assessment and plan of care.    If you have questions, please do not hesitate to call me. I look forward to following Shadi along with you.         Sincerely,        Teofilo Snow MD        CC:   No Recipients    Teofilo Snow MD  05/30/23 1702  Signed  Notes by MA:  Patient has been referred to our office for dizziness, gait and memory.       Subjective:     Dear Dr. Cancino, thank you for referring Mr. Field for neurological consultation.  As you know, he is a 67-year-old right-handed gentleman sent for evaluation of memory complaints, imbalance, and dizzy spells.  He thinks that his memory has been poor for a year or so.  He thinks that his symptoms began after learning that he had a \"brain tumor\".  This threw him into a spiral of depression and he thinks that that is where his memory complaints or issues began.  Although he struggles with memory, he has no difficulty with activities of daily living and remains functionally independent per his report.  He also has imbalance, when he stands up and walks it typically will tilt or wander off to 1 side or the other.  Has a difficult time maintaining a straight line.  He does not lose consciousness.  He does not fall.  In addition to this, he has separate episodes of movement induced vertigo symptoms associated with nausea and lightheadedness.  He says that he saw ENT for this.  He tells me he had thorough work-ups there and nothing was found.  He is not interested in returning to ENT for these complaints.  He had 1 episode of hearing loss associated with these events but all the others have not been associated with hearing changes and he has no current tinnitus.    Orthostatics today are " unremarkable.  Patient ID: Shadi Field is a 67 y.o. male.    History of Present Illness  The following portions of the patient's history were reviewed and updated as appropriate: allergies, current medications, past family history, past medical history, past social history, past surgical history and problem list.    Review of Systems   Constitutional: Positive for fatigue. Negative for activity change and appetite change.   HENT: Negative for facial swelling and trouble swallowing.    Eyes: Negative for photophobia, pain and visual disturbance.   Respiratory: Negative for chest tightness, shortness of breath and wheezing.    Cardiovascular: Negative for chest pain, palpitations and leg swelling.   Gastrointestinal: Negative for abdominal pain, nausea and vomiting.   Musculoskeletal: Positive for back pain and gait problem. Negative for arthralgias, joint swelling, myalgias, neck pain and neck stiffness.   Neurological: Negative for dizziness, tremors, seizures, syncope, facial asymmetry, speech difficulty, weakness, light-headedness, numbness and headaches.   Hematological: Does not bruise/bleed easily.   Psychiatric/Behavioral: Positive for sleep disturbance. Negative for agitation, behavioral problems, confusion, decreased concentration, dysphoric mood, hallucinations, self-injury and suicidal ideas. The patient is nervous/anxious. The patient is not hyperactive.         Objective:    Neurologic Exam  Awake alert pleasant cooperative but easily gets sidetracked and has to be redirected multiple times throughout the examination today.  Speech is fluent however and he follows simple commands quite well.  Notable cognitive screens were performed.  He scored 26 on the Folstein and 23 on the Marshall with primary deficits in recall.  His animal fluency score was good at 19.    Cranial nerves II through XII normal and symmetric.    Motor exam reveals reasonable and symmetric tone bulk and power with no lateralization.   No spasticity.  No drift.    Sensory exam reveals a length dependent decrease in temperature light touch sensation in the distal lower extremities bilaterally.    Coordination testing reveals smooth and accurate finger-nose-finger but some clumsiness with heel-to-shin and rapid alternating movements.  Walks on a slightly widened base.  No spastic features.  Romberg testing is borderline.    Tendon reflexes are 1+ and symmetric throughout in the arms, 1+ at the knees and absent at the ankles with mute plantar responses bilaterally.  Physical Exam    Assessment/Plan:    Diagnoses and all orders for this visit:    1. Vertigo (Primary)  -     Ambulatory Referral to Physical Therapy Vestibular    2. Imbalance    3. Meningioma    4. Memory loss    5. Diabetic polyneuropathy associated with type 2 diabetes mellitus    Other orders  -     DULoxetine (Cymbalta) 30 MG capsule; Take 1 capsule by mouth Daily.  Dispense: 7 capsule; Refill: 0  -     DULoxetine (Cymbalta) 60 MG capsule; Take 1 capsule by mouth Daily.  Dispense: 30 capsule; Refill: 2    His primary issue today is memory loss.  He certainly scores abnormally on our cognitive screens, primarily in the immediate recall.  However, otherwise he appears to be doing quite well and remains functionally independent.  The differential at this point includes pseudodementia secondary to mood disorder as well as MCI, favor the former at the current time.  I think a formal neuropsych evaluation would be very helpful for him and to help guide appropriate treatment for him.    In my opinion, his imbalance is secondary to sensory ataxia from his known diabetic peripheral polyneuropathy.  He is having some neuropathic pain but is unable to tolerate gabapentin at prescribed doses.  Therefore changing him over to Cymbalta with a titration to 60 mg daily, which may also have the side benefit of helping his depression.    Intermittent spells of vertigo, independent from his complaints  of imbalance.  He tells me that he has already seen ENT in the past and relates to me that no clear finding or diagnosis was entertained.  I am referring him on to vestibular therapy for evaluation for possible/probable benign paroxysmal positional vertigo, but it may be in his best interest to return to ENT as well although he says he is not interested in returning to ENT at this time.    Small stable intracranial meningioma.  He has appropriate yearly follow-up CTs and clinical follow-up with you for this and I have encouraged him to follow-up as scheduled.    Thank you very much for the opportunity to participate in his care.    65 minutes total patient care time today including record review, evaluation and discussion,  and documentation.

## 2023-05-30 NOTE — PROGRESS NOTES
"Notes by MA:  Patient has been referred to our office for dizziness, gait and memory.       Subjective:     Dear Dr. Cancino, thank you for referring Mr. Field for neurological consultation.  As you know, he is a 67-year-old right-handed gentleman sent for evaluation of memory complaints, imbalance, and dizzy spells.  He thinks that his memory has been poor for a year or so.  He thinks that his symptoms began after learning that he had a \"brain tumor\".  This threw him into a spiral of depression and he thinks that that is where his memory complaints or issues began.  Although he struggles with memory, he has no difficulty with activities of daily living and remains functionally independent per his report.  He also has imbalance, when he stands up and walks it typically will tilt or wander off to 1 side or the other.  Has a difficult time maintaining a straight line.  He does not lose consciousness.  He does not fall.  In addition to this, he has separate episodes of movement induced vertigo symptoms associated with nausea and lightheadedness.  He says that he saw ENT for this.  He tells me he had thorough work-ups there and nothing was found.  He is not interested in returning to ENT for these complaints.  He had 1 episode of hearing loss associated with these events but all the others have not been associated with hearing changes and he has no current tinnitus.    Orthostatics today are unremarkable.  Patient ID: hSadi Field is a 67 y.o. male.    History of Present Illness  The following portions of the patient's history were reviewed and updated as appropriate: allergies, current medications, past family history, past medical history, past social history, past surgical history and problem list.    Review of Systems   Constitutional: Positive for fatigue. Negative for activity change and appetite change.   HENT: Negative for facial swelling and trouble swallowing.    Eyes: Negative for photophobia, pain and " visual disturbance.   Respiratory: Negative for chest tightness, shortness of breath and wheezing.    Cardiovascular: Negative for chest pain, palpitations and leg swelling.   Gastrointestinal: Negative for abdominal pain, nausea and vomiting.   Musculoskeletal: Positive for back pain and gait problem. Negative for arthralgias, joint swelling, myalgias, neck pain and neck stiffness.   Neurological: Negative for dizziness, tremors, seizures, syncope, facial asymmetry, speech difficulty, weakness, light-headedness, numbness and headaches.   Hematological: Does not bruise/bleed easily.   Psychiatric/Behavioral: Positive for sleep disturbance. Negative for agitation, behavioral problems, confusion, decreased concentration, dysphoric mood, hallucinations, self-injury and suicidal ideas. The patient is nervous/anxious. The patient is not hyperactive.         Objective:    Neurologic Exam  Awake alert pleasant cooperative but easily gets sidetracked and has to be redirected multiple times throughout the examination today.  Speech is fluent however and he follows simple commands quite well.  Notable cognitive screens were performed.  He scored 26 on the Folstein and 23 on the [place] with primary deficits in recall.  His animal fluency score was good at 19.    Cranial nerves II through XII normal and symmetric.    Motor exam reveals reasonable and symmetric tone bulk and power with no lateralization.  No spasticity.  No drift.    Sensory exam reveals a length dependent decrease in temperature light touch sensation in the distal lower extremities bilaterally.    Coordination testing reveals smooth and accurate finger-nose-finger but some clumsiness with heel-to-shin and rapid alternating movements.  Walks on a slightly widened base.  No spastic features.  Romberg testing is borderline.    Tendon reflexes are 1+ and symmetric throughout in the arms, 1+ at the knees and absent at the ankles with mute plantar responses  bilaterally.  Physical Exam    Assessment/Plan:     Diagnoses and all orders for this visit:    1. Vertigo (Primary)  -     Ambulatory Referral to Physical Therapy Vestibular    2. Imbalance    3. Meningioma    4. Memory loss    5. Diabetic polyneuropathy associated with type 2 diabetes mellitus    Other orders  -     DULoxetine (Cymbalta) 30 MG capsule; Take 1 capsule by mouth Daily.  Dispense: 7 capsule; Refill: 0  -     DULoxetine (Cymbalta) 60 MG capsule; Take 1 capsule by mouth Daily.  Dispense: 30 capsule; Refill: 2     His primary issue today is memory loss.  He certainly scores abnormally on our cognitive screens, primarily in the immediate recall.  However, otherwise he appears to be doing quite well and remains functionally independent.  The differential at this point includes pseudodementia secondary to mood disorder as well as MCI, favor the former at the current time.  I think a formal neuropsych evaluation would be very helpful for him and to help guide appropriate treatment for him.    In my opinion, his imbalance is secondary to sensory ataxia from his known diabetic peripheral polyneuropathy.  He is having some neuropathic pain but is unable to tolerate gabapentin at prescribed doses.  Therefore changing him over to Cymbalta with a titration to 60 mg daily, which may also have the side benefit of helping his depression.    Intermittent spells of vertigo, independent from his complaints of imbalance.  He tells me that he has already seen ENT in the past and relates to me that no clear finding or diagnosis was entertained.  I am referring him on to vestibular therapy for evaluation for possible/probable benign paroxysmal positional vertigo, but it may be in his best interest to return to ENT as well although he says he is not interested in returning to ENT at this time.    Small stable intracranial meningioma.  He has appropriate yearly follow-up CTs and clinical follow-up with you for this and I have  encouraged him to follow-up as scheduled.    Thank you very much for the opportunity to participate in his care.    65 minutes total patient care time today including record review, evaluation and discussion,  and documentation.

## 2023-09-05 RX ORDER — DULOXETIN HYDROCHLORIDE 60 MG/1
60 CAPSULE, DELAYED RELEASE ORAL DAILY
Qty: 30 CAPSULE | Refills: 2 | Status: SHIPPED | OUTPATIENT
Start: 2023-09-05 | End: 2024-09-04

## 2023-11-03 ENCOUNTER — TELEPHONE (OUTPATIENT)
Dept: NEUROLOGY | Facility: CLINIC | Age: 68
End: 2023-11-03
Payer: MEDICARE

## 2023-11-03 NOTE — TELEPHONE ENCOUNTER
11/03/2023  Spoke to Juanita with [facility], they left several messages for patient, he did not call them back to schedule appointment

## 2024-04-08 ENCOUNTER — TELEPHONE (OUTPATIENT)
Dept: NEUROSURGERY | Facility: CLINIC | Age: 69
End: 2024-04-08
Payer: MEDICARE

## 2024-04-08 NOTE — TELEPHONE ENCOUNTER
I called and LVM for patient to call the office regarding his appointment for 04-11-24. He did not have his imaging done and that will need to be rescheduled at [phone]. I am canceling FU appt for now. [facility] can advise.